# Patient Record
Sex: FEMALE | Race: WHITE | Employment: STUDENT | URBAN - METROPOLITAN AREA
[De-identification: names, ages, dates, MRNs, and addresses within clinical notes are randomized per-mention and may not be internally consistent; named-entity substitution may affect disease eponyms.]

---
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
## 2019-09-25 LAB
ABO, EXTERNAL RESULT: NORMAL
GBS, EXTERNAL RESULT: POSITIVE
HEP B, EXTERNAL RESULT: NEGATIVE
HIV, EXTERNAL RESULT: NON REACTIVE
RH FACTOR, EXTERNAL RESULT: NEGATIVE
RPR, EXTERNAL RESULT: NEGATIVE
RUBELLA TITER, EXTERNAL RESULT: NORMAL

## 2020-02-04 ENCOUNTER — HOSPITAL ENCOUNTER (OUTPATIENT)
Dept: NURSING | Age: 20
Setting detail: INFUSION SERIES
Discharge: HOME | End: 2020-02-04
Payer: COMMERCIAL

## 2020-02-04 VITALS
RESPIRATION RATE: 16 BRPM | BODY MASS INDEX: 26.03 KG/M2 | HEIGHT: 66 IN | HEART RATE: 102 BPM | TEMPERATURE: 97.4 F | DIASTOLIC BLOOD PRESSURE: 78 MMHG | WEIGHT: 162 LBS | SYSTOLIC BLOOD PRESSURE: 123 MMHG

## 2020-02-04 LAB — RHIG LOT NUMBER: NORMAL

## 2020-02-04 PROCEDURE — 96372 THER/PROPH/DIAG INJ SC/IM: CPT

## 2020-02-04 PROCEDURE — 6360000002 HC RX W HCPCS: Performed by: ADVANCED PRACTICE MIDWIFE

## 2020-02-04 RX ADMIN — HUMAN RHO(D) IMMUNE GLOBULIN 300 MCG: 300 INJECTION, SOLUTION INTRAMUSCULAR at 10:56

## 2020-02-04 NOTE — PROGRESS NOTES
Pt tolerates injection well discharge instructions given and reviewed with patient. Instructed to stay in facility 15 minutes post injection and return to unit with any s/s of reaction.

## 2020-04-14 ENCOUNTER — HOSPITAL ENCOUNTER (INPATIENT)
Age: 20
LOS: 3 days | Discharge: HOME | End: 2020-04-17
Attending: OBSTETRICS & GYNECOLOGY | Admitting: OBSTETRICS & GYNECOLOGY
Payer: COMMERCIAL

## 2020-04-14 ENCOUNTER — APPOINTMENT (OUTPATIENT)
Dept: LABOR AND DELIVERY | Age: 20
End: 2020-04-14
Payer: COMMERCIAL

## 2020-04-14 PROBLEM — O13.3 GESTATIONAL HYPERTENSION, THIRD TRIMESTER: Status: ACTIVE | Noted: 2020-04-14

## 2020-04-14 LAB
A/G RATIO: 1.1 (ref 1.1–2.2)
ABO/RH: NORMAL
ALBUMIN SERPL-MCNC: 3.7 G/DL (ref 3.4–5)
ALP BLD-CCNC: 128 U/L (ref 40–129)
ALT SERPL-CCNC: 7 U/L (ref 10–40)
AMPHETAMINE SCREEN, URINE: NORMAL
ANION GAP SERPL CALCULATED.3IONS-SCNC: 13 MMOL/L (ref 3–16)
ANTIBODY IDENTIFICATION: NORMAL
ANTIBODY SCREEN: NORMAL
AST SERPL-CCNC: 16 U/L (ref 15–37)
BARBITURATE SCREEN URINE: NORMAL
BASOPHILS ABSOLUTE: 0 K/UL (ref 0–0.2)
BASOPHILS RELATIVE PERCENT: 0.2 %
BENZODIAZEPINE SCREEN, URINE: NORMAL
BILIRUB SERPL-MCNC: <0.2 MG/DL (ref 0–1)
BUN BLDV-MCNC: 7 MG/DL (ref 7–20)
BUPRENORPHINE URINE: NORMAL
CALCIUM SERPL-MCNC: 9.7 MG/DL (ref 8.3–10.6)
CANNABINOID SCREEN URINE: NORMAL
CHLORIDE BLD-SCNC: 105 MMOL/L (ref 99–110)
CO2: 20 MMOL/L (ref 21–32)
COCAINE METABOLITE SCREEN URINE: NORMAL
CREAT SERPL-MCNC: <0.5 MG/DL (ref 0.6–1.1)
CREATININE URINE: 46.1 MG/DL (ref 28–259)
DAT IGG CAPTURE: NORMAL
EOSINOPHILS ABSOLUTE: 0.1 K/UL (ref 0–0.6)
EOSINOPHILS RELATIVE PERCENT: 1.1 %
GFR AFRICAN AMERICAN: >60
GFR NON-AFRICAN AMERICAN: >60
GLOBULIN: 3.3 G/DL
GLUCOSE BLD-MCNC: 85 MG/DL (ref 70–99)
HCT VFR BLD CALC: 39 % (ref 36–48)
HEMOGLOBIN: 13.3 G/DL (ref 12–16)
LYMPHOCYTES ABSOLUTE: 1.2 K/UL (ref 1–5.1)
LYMPHOCYTES RELATIVE PERCENT: 11.7 %
Lab: NORMAL
MCH RBC QN AUTO: 30.8 PG (ref 26–34)
MCHC RBC AUTO-ENTMCNC: 34 G/DL (ref 31–36)
MCV RBC AUTO: 90.5 FL (ref 80–100)
METHADONE SCREEN, URINE: NORMAL
MONOCYTES ABSOLUTE: 0.5 K/UL (ref 0–1.3)
MONOCYTES RELATIVE PERCENT: 5.2 %
NEUTROPHILS ABSOLUTE: 8.5 K/UL (ref 1.7–7.7)
NEUTROPHILS RELATIVE PERCENT: 81.8 %
OPIATE SCREEN URINE: NORMAL
OXYCODONE URINE: NORMAL
PDW BLD-RTO: 15 % (ref 12.4–15.4)
PH UA: 6
PHENCYCLIDINE SCREEN URINE: NORMAL
PLATELET # BLD: 117 K/UL (ref 135–450)
PMV BLD AUTO: 10.3 FL (ref 5–10.5)
POTASSIUM SERPL-SCNC: 3.8 MMOL/L (ref 3.5–5.1)
PROPOXYPHENE SCREEN: NORMAL
PROTEIN PROTEIN: 10 MG/DL
PROTEIN/CREAT RATIO: 0.2 MG/DL
RBC # BLD: 4.31 M/UL (ref 4–5.2)
SODIUM BLD-SCNC: 138 MMOL/L (ref 136–145)
TOTAL PROTEIN: 7 G/DL (ref 6.4–8.2)
URIC ACID, SERUM: 6 MG/DL (ref 2.6–6)
WBC # BLD: 10.3 K/UL (ref 4–11)

## 2020-04-14 PROCEDURE — 86900 BLOOD TYPING SEROLOGIC ABO: CPT

## 2020-04-14 PROCEDURE — 82570 ASSAY OF URINE CREATININE: CPT

## 2020-04-14 PROCEDURE — 1220000000 HC SEMI PRIVATE OB R&B

## 2020-04-14 PROCEDURE — 80307 DRUG TEST PRSMV CHEM ANLYZR: CPT

## 2020-04-14 PROCEDURE — 2580000003 HC RX 258

## 2020-04-14 PROCEDURE — 84156 ASSAY OF PROTEIN URINE: CPT

## 2020-04-14 PROCEDURE — 85025 COMPLETE CBC W/AUTO DIFF WBC: CPT

## 2020-04-14 PROCEDURE — 86870 RBC ANTIBODY IDENTIFICATION: CPT

## 2020-04-14 PROCEDURE — 86880 COOMBS TEST DIRECT: CPT

## 2020-04-14 PROCEDURE — 86780 TREPONEMA PALLIDUM: CPT

## 2020-04-14 PROCEDURE — 86901 BLOOD TYPING SEROLOGIC RH(D): CPT

## 2020-04-14 PROCEDURE — 86850 RBC ANTIBODY SCREEN: CPT

## 2020-04-14 PROCEDURE — 84550 ASSAY OF BLOOD/URIC ACID: CPT

## 2020-04-14 PROCEDURE — 80053 COMPREHEN METABOLIC PANEL: CPT

## 2020-04-14 PROCEDURE — 6370000000 HC RX 637 (ALT 250 FOR IP): Performed by: OBSTETRICS & GYNECOLOGY

## 2020-04-14 RX ORDER — ONDANSETRON 2 MG/ML
4 INJECTION INTRAMUSCULAR; INTRAVENOUS EVERY 6 HOURS PRN
Status: DISCONTINUED | OUTPATIENT
Start: 2020-04-14 | End: 2020-04-15

## 2020-04-14 RX ORDER — SODIUM CHLORIDE, SODIUM LACTATE, POTASSIUM CHLORIDE, CALCIUM CHLORIDE 600; 310; 30; 20 MG/100ML; MG/100ML; MG/100ML; MG/100ML
INJECTION, SOLUTION INTRAVENOUS CONTINUOUS
Status: DISCONTINUED | OUTPATIENT
Start: 2020-04-14 | End: 2020-04-15

## 2020-04-14 RX ORDER — SODIUM CHLORIDE 0.9 % (FLUSH) 0.9 %
10 SYRINGE (ML) INJECTION EVERY 12 HOURS SCHEDULED
Status: DISCONTINUED | OUTPATIENT
Start: 2020-04-14 | End: 2020-04-15

## 2020-04-14 RX ORDER — SODIUM CHLORIDE 0.9 % (FLUSH) 0.9 %
10 SYRINGE (ML) INJECTION PRN
Status: DISCONTINUED | OUTPATIENT
Start: 2020-04-14 | End: 2020-04-15

## 2020-04-14 RX ORDER — SODIUM CHLORIDE, SODIUM LACTATE, POTASSIUM CHLORIDE, CALCIUM CHLORIDE 600; 310; 30; 20 MG/100ML; MG/100ML; MG/100ML; MG/100ML
INJECTION, SOLUTION INTRAVENOUS
Status: COMPLETED
Start: 2020-04-14 | End: 2020-04-14

## 2020-04-14 RX ADMIN — SODIUM CHLORIDE, SODIUM LACTATE, POTASSIUM CHLORIDE, CALCIUM CHLORIDE: 600; 310; 30; 20 INJECTION, SOLUTION INTRAVENOUS at 19:40

## 2020-04-14 RX ADMIN — SODIUM CHLORIDE, POTASSIUM CHLORIDE, SODIUM LACTATE AND CALCIUM CHLORIDE: 600; 310; 30; 20 INJECTION, SOLUTION INTRAVENOUS at 19:40

## 2020-04-14 RX ADMIN — Medication 25 MCG: at 20:27

## 2020-04-15 ENCOUNTER — ANESTHESIA EVENT (OUTPATIENT)
Dept: LABOR AND DELIVERY | Age: 20
End: 2020-04-15
Payer: COMMERCIAL

## 2020-04-15 ENCOUNTER — ANESTHESIA (OUTPATIENT)
Dept: LABOR AND DELIVERY | Age: 20
End: 2020-04-15
Payer: COMMERCIAL

## 2020-04-15 LAB — TOTAL SYPHILLIS IGG/IGM: NORMAL

## 2020-04-15 PROCEDURE — 2580000003 HC RX 258

## 2020-04-15 PROCEDURE — 2500000003 HC RX 250 WO HCPCS: Performed by: NURSE ANESTHETIST, CERTIFIED REGISTERED

## 2020-04-15 PROCEDURE — 2580000003 HC RX 258: Performed by: OBSTETRICS & GYNECOLOGY

## 2020-04-15 PROCEDURE — 6360000002 HC RX W HCPCS: Performed by: OBSTETRICS & GYNECOLOGY

## 2020-04-15 PROCEDURE — 7200000001 HC VAGINAL DELIVERY

## 2020-04-15 PROCEDURE — 3700000025 EPIDURAL BLOCK: Performed by: ANESTHESIOLOGY

## 2020-04-15 PROCEDURE — 0UQMXZZ REPAIR VULVA, EXTERNAL APPROACH: ICD-10-PCS | Performed by: ADVANCED PRACTICE MIDWIFE

## 2020-04-15 PROCEDURE — 6370000000 HC RX 637 (ALT 250 FOR IP): Performed by: ADVANCED PRACTICE MIDWIFE

## 2020-04-15 PROCEDURE — 3E0P7VZ INTRODUCTION OF HORMONE INTO FEMALE REPRODUCTIVE, VIA NATURAL OR ARTIFICIAL OPENING: ICD-10-PCS | Performed by: ADVANCED PRACTICE MIDWIFE

## 2020-04-15 PROCEDURE — 1220000000 HC SEMI PRIVATE OB R&B

## 2020-04-15 PROCEDURE — 51701 INSERT BLADDER CATHETER: CPT

## 2020-04-15 RX ORDER — DOCUSATE SODIUM 100 MG/1
100 CAPSULE, LIQUID FILLED ORAL 2 TIMES DAILY
Status: DISCONTINUED | OUTPATIENT
Start: 2020-04-15 | End: 2020-04-17 | Stop reason: HOSPADM

## 2020-04-15 RX ORDER — SODIUM CHLORIDE, SODIUM LACTATE, POTASSIUM CHLORIDE, CALCIUM CHLORIDE 600; 310; 30; 20 MG/100ML; MG/100ML; MG/100ML; MG/100ML
INJECTION, SOLUTION INTRAVENOUS CONTINUOUS
Status: DISCONTINUED | OUTPATIENT
Start: 2020-04-15 | End: 2020-04-17 | Stop reason: HOSPADM

## 2020-04-15 RX ORDER — IBUPROFEN 800 MG/1
800 TABLET ORAL EVERY 8 HOURS
Status: DISCONTINUED | OUTPATIENT
Start: 2020-04-16 | End: 2020-04-17 | Stop reason: HOSPADM

## 2020-04-15 RX ORDER — SODIUM CHLORIDE 0.9 % (FLUSH) 0.9 %
10 SYRINGE (ML) INJECTION EVERY 12 HOURS SCHEDULED
Status: DISCONTINUED | OUTPATIENT
Start: 2020-04-15 | End: 2020-04-17 | Stop reason: HOSPADM

## 2020-04-15 RX ORDER — BUPIVACAINE HYDROCHLORIDE 5 MG/ML
INJECTION, SOLUTION EPIDURAL; INTRACAUDAL PRN
Status: DISCONTINUED | OUTPATIENT
Start: 2020-04-15 | End: 2020-04-15 | Stop reason: SDUPTHER

## 2020-04-15 RX ORDER — ACETAMINOPHEN 325 MG/1
650 TABLET ORAL EVERY 4 HOURS PRN
Status: DISCONTINUED | OUTPATIENT
Start: 2020-04-15 | End: 2020-04-17 | Stop reason: HOSPADM

## 2020-04-15 RX ORDER — LIDOCAINE HYDROCHLORIDE 20 MG/ML
INJECTION, SOLUTION EPIDURAL; INFILTRATION; INTRACAUDAL; PERINEURAL PRN
Status: DISCONTINUED | OUTPATIENT
Start: 2020-04-15 | End: 2020-04-15 | Stop reason: SDUPTHER

## 2020-04-15 RX ORDER — SODIUM CHLORIDE, SODIUM LACTATE, POTASSIUM CHLORIDE, CALCIUM CHLORIDE 600; 310; 30; 20 MG/100ML; MG/100ML; MG/100ML; MG/100ML
INJECTION, SOLUTION INTRAVENOUS
Status: COMPLETED
Start: 2020-04-15 | End: 2020-04-15

## 2020-04-15 RX ORDER — BUPIVACAINE HYDROCHLORIDE 2.5 MG/ML
INJECTION, SOLUTION EPIDURAL; INFILTRATION; INTRACAUDAL PRN
Status: DISCONTINUED | OUTPATIENT
Start: 2020-04-15 | End: 2020-04-15 | Stop reason: SDUPTHER

## 2020-04-15 RX ORDER — LANOLIN 100 %
OINTMENT (GRAM) TOPICAL PRN
Status: DISCONTINUED | OUTPATIENT
Start: 2020-04-15 | End: 2020-04-17 | Stop reason: HOSPADM

## 2020-04-15 RX ORDER — SODIUM CHLORIDE 0.9 % (FLUSH) 0.9 %
10 SYRINGE (ML) INJECTION PRN
Status: DISCONTINUED | OUTPATIENT
Start: 2020-04-15 | End: 2020-04-17 | Stop reason: HOSPADM

## 2020-04-15 RX ADMIN — SODIUM CHLORIDE, POTASSIUM CHLORIDE, SODIUM LACTATE AND CALCIUM CHLORIDE: 600; 310; 30; 20 INJECTION, SOLUTION INTRAVENOUS at 01:02

## 2020-04-15 RX ADMIN — LIDOCAINE HYDROCHLORIDE 2 ML: 20 INJECTION, SOLUTION EPIDURAL; INFILTRATION; INTRACAUDAL; PERINEURAL at 12:42

## 2020-04-15 RX ADMIN — Medication 2.5 MILLION UNITS: at 07:21

## 2020-04-15 RX ADMIN — Medication 2.5 MILLION UNITS: at 11:27

## 2020-04-15 RX ADMIN — DEXTROSE MONOHYDRATE 5 MILLION UNITS: 5 INJECTION INTRAVENOUS at 02:44

## 2020-04-15 RX ADMIN — Medication 2.5 MILLION UNITS: at 15:15

## 2020-04-15 RX ADMIN — DOCUSATE SODIUM 100 MG: 100 CAPSULE, LIQUID FILLED ORAL at 19:55

## 2020-04-15 RX ADMIN — BUPIVACAINE HYDROCHLORIDE 3 ML: 5 INJECTION, SOLUTION EPIDURAL; INTRACAUDAL; PERINEURAL at 12:42

## 2020-04-15 RX ADMIN — BUPIVACAINE HYDROCHLORIDE 2.5 ML: 5 INJECTION, SOLUTION EPIDURAL; INTRACAUDAL; PERINEURAL at 02:22

## 2020-04-15 RX ADMIN — Medication 15 ML/HR: at 02:28

## 2020-04-15 RX ADMIN — SODIUM CHLORIDE, POTASSIUM CHLORIDE, SODIUM LACTATE AND CALCIUM CHLORIDE 1000 ML: 600; 310; 30; 20 INJECTION, SOLUTION INTRAVENOUS at 18:18

## 2020-04-15 RX ADMIN — BUPIVACAINE HYDROCHLORIDE 3 ML: 2.5 INJECTION, SOLUTION EPIDURAL; INFILTRATION; INTRACAUDAL; PERINEURAL at 12:42

## 2020-04-15 RX ADMIN — BUPIVACAINE HYDROCHLORIDE 2.5 ML: 2.5 INJECTION, SOLUTION EPIDURAL; INFILTRATION; INTRACAUDAL; PERINEURAL at 02:22

## 2020-04-15 RX ADMIN — BUPIVACAINE HYDROCHLORIDE 5 ML: 5 INJECTION, SOLUTION EPIDURAL; INTRACAUDAL; PERINEURAL at 06:20

## 2020-04-15 RX ADMIN — SODIUM CHLORIDE, POTASSIUM CHLORIDE, SODIUM LACTATE AND CALCIUM CHLORIDE: 600; 310; 30; 20 INJECTION, SOLUTION INTRAVENOUS at 04:11

## 2020-04-15 RX ADMIN — SODIUM CHLORIDE, POTASSIUM CHLORIDE, SODIUM LACTATE AND CALCIUM CHLORIDE: 600; 310; 30; 20 INJECTION, SOLUTION INTRAVENOUS at 11:27

## 2020-04-15 RX ADMIN — BENZOCAINE AND LEVOMENTHOL: 200; 5 SPRAY TOPICAL at 19:45

## 2020-04-15 RX ADMIN — BUPIVACAINE HYDROCHLORIDE 5 ML: 2.5 INJECTION, SOLUTION EPIDURAL; INFILTRATION; INTRACAUDAL; PERINEURAL at 06:20

## 2020-04-15 NOTE — PLAN OF CARE
Problem: Anxiety:  Goal: Level of anxiety will decrease  Description: Level of anxiety will decrease  Outcome: Ongoing     Problem: Breathing Pattern - Ineffective:  Goal: Able to breathe comfortably  Description: Able to breathe comfortably  Outcome: Ongoing     Problem: Fluid Volume - Imbalance:  Goal: Absence of imbalanced fluid volume signs and symptoms  Description: Absence of imbalanced fluid volume signs and symptoms  Outcome: Ongoing  Goal: Absence of intrapartum hemorrhage signs and symptoms  Description: Absence of intrapartum hemorrhage signs and symptoms  Outcome: Ongoing     Problem: Infection - Intrapartum Infection:  Goal: Will show no infection signs and symptoms  Description: Will show no infection signs and symptoms  Outcome: Ongoing     Problem: Labor Process - Prolonged:  Goal: Labor progression, first stage, within specified pattern  Description: Labor progression, first stage, within specified pattern  Outcome: Ongoing  Goal: Labor progession, second stage, within specified pattern  Description: Labor progession, second stage, within specified pattern  Outcome: Ongoing  Goal: Uterine contractions within specified parameters  Description: Uterine contractions within specified parameters  Outcome: Ongoing     Problem:  Screening:  Goal: Ability to make informed decisions regarding treatment has improved  Description: Ability to make informed decisions regarding treatment has improved  Outcome: Ongoing     Problem: Pain - Acute:  Goal: Pain level will decrease  Description: Pain level will decrease  Outcome: Ongoing  Goal: Able to cope with pain  Description: Able to cope with pain  Outcome: Ongoing     Problem: Tissue Perfusion - Uteroplacental, Altered:  Goal: Absence of abnormal fetal heart rate pattern  Description: Absence of abnormal fetal heart rate pattern  Outcome: Ongoing     Problem: Urinary Retention:  Goal: Experiences of bladder distention will decrease  Description:

## 2020-04-15 NOTE — PROCEDURES
Department of Obstetrics and Gynecology  Spontaneous Vaginal Delivery Note    Labor & Delivery Summary  Dilation Complete Date: 04/15/20  Dilation Complete Time: 1415    Pre-operative Diagnosis:  Term pregnancy    Post-operative Diagnosis:  Living  infant(s) and Female    Procedure:  Spontaneous vaginal delivery    Surgeon:       Information for the patient's :  Cartorie Lawson Agnes Resendiz [[id number]]          Anesthesia:  epidural anesthesia    Estimated blood loss:  300ml    Specimen:  Placenta not sent to pathology     Cord blood sent Yes    Complications:  Gestational HTN    Condition:  infant stable to general nursery and mother stable    Details of Procedure: The patient is a 23 y.o. female at 38w9d   OB History        1    Para        Term                AB        Living           SAB        TAB        Ectopic        Molar        Multiple        Live Births                 who was admitted for induction secondary to [facility] @ 40 5/7 wks. She received the following interventions: vaginal Cytotec She was known to be GBS positive and did receive antibiotic prophylaxis. SROM with thin meconium. The patient progressed well,did receive an epidural, became complete and started to push. After pushing for 45 min the fetal head was at the perineum, 20 second shoulder dystocia resolved with laying bed down and Anshu and the rest of the infant delivered atraumatically, placed on mother abdomen. Cord was clamped and cut after delayed cord clamping and infant remained on mother's abdomen for nurse evaluation Apgars 8/9. The delivery of the placenta was spontaneous. The perineum and vagina were explored and a bilateral periurethral repaired with 4.0 vicryl under existing epidural.  in the standard fashion. Dr. Kenyetta Salmeron will be notified of this delivery.

## 2020-04-15 NOTE — ANESTHESIA PROCEDURE NOTES
Epidural Block    Patient location during procedure: OB  Start time: 4/15/2020 1:46 AM  End time: 4/15/2020 2:28 AM  Reason for block: labor epidural  Staffing  Resident/CRNA: STEPHANIA Colvin - CRNA  Performed: resident/CRNA   Preanesthetic Checklist  Completed: patient identified, site marked, surgical consent, pre-op evaluation, timeout performed, IV checked, risks and benefits discussed, monitors and equipment checked, anesthesia consent given, oxygen available and patient being monitored  Epidural  Patient position: sitting  Prep: ChloraPrep  Patient monitoring: continuous pulse ox and frequent blood pressure checks  Approach: midline  Location: lumbar (1-5) (L4-5)  Injection technique: SAY saline  Provider prep: mask and sterile gloves  Needle  Needle type: Tuohy   Needle gauge: 17 G  Needle length: 3.5 in  Catheter type: side hole  Catheter size: 19 G (20 G)  Catheter at skin depth: 10 cm  Test dose: negative  Assessment  Sensory level: T6  Hemodynamics: stable  Attempts: 3+  Additional Notes  Sitting, Sterile prep/drape, 1%Xylo at L3-4, 17ga Tuohy, attempt x 3 w/o success, 1% Xylo at L2-3, attempt x 2 w/o success, 1 % Xylo at L4-5, 17ga Tuohy with SAY on 2nd attempt, Catheter inserted, negative test dose, sterile dressing applied.

## 2020-04-16 LAB
ABO/RH: NORMAL
FETAL SCREEN: NORMAL
HCT VFR BLD CALC: 29.3 % (ref 36–48)
HEMOGLOBIN: 9.8 G/DL (ref 12–16)
MCH RBC QN AUTO: 30.7 PG (ref 26–34)
MCHC RBC AUTO-ENTMCNC: 33.6 G/DL (ref 31–36)
MCV RBC AUTO: 91.3 FL (ref 80–100)
PDW BLD-RTO: 14.9 % (ref 12.4–15.4)
PLATELET # BLD: 97 K/UL (ref 135–450)
PLATELET SLIDE REVIEW: ABNORMAL
PMV BLD AUTO: 10.3 FL (ref 5–10.5)
RBC # BLD: 3.21 M/UL (ref 4–5.2)
RHIG LOT NUMBER: NORMAL
SLIDE REVIEW: ABNORMAL
WBC # BLD: 12.8 K/UL (ref 4–11)

## 2020-04-16 PROCEDURE — 6360000002 HC RX W HCPCS: Performed by: ADVANCED PRACTICE MIDWIFE

## 2020-04-16 PROCEDURE — 6370000000 HC RX 637 (ALT 250 FOR IP): Performed by: ADVANCED PRACTICE MIDWIFE

## 2020-04-16 PROCEDURE — 85027 COMPLETE CBC AUTOMATED: CPT

## 2020-04-16 PROCEDURE — 96372 THER/PROPH/DIAG INJ SC/IM: CPT

## 2020-04-16 PROCEDURE — 86900 BLOOD TYPING SEROLOGIC ABO: CPT

## 2020-04-16 PROCEDURE — 85461 HEMOGLOBIN FETAL: CPT

## 2020-04-16 PROCEDURE — 86901 BLOOD TYPING SEROLOGIC RH(D): CPT

## 2020-04-16 PROCEDURE — 1220000000 HC SEMI PRIVATE OB R&B

## 2020-04-16 PROCEDURE — 36415 COLL VENOUS BLD VENIPUNCTURE: CPT

## 2020-04-16 RX ORDER — FERROUS SULFATE 325(65) MG
325 TABLET ORAL 2 TIMES DAILY WITH MEALS
Status: DISCONTINUED | OUTPATIENT
Start: 2020-04-16 | End: 2020-04-17 | Stop reason: HOSPADM

## 2020-04-16 RX ADMIN — MOXIFLOXACIN HYDROCHLORIDE 800 MG: 400 TABLET, FILM COATED ORAL at 18:08

## 2020-04-16 RX ADMIN — DOCUSATE SODIUM 100 MG: 100 CAPSULE, LIQUID FILLED ORAL at 20:49

## 2020-04-16 RX ADMIN — MOXIFLOXACIN HYDROCHLORIDE 800 MG: 400 TABLET, FILM COATED ORAL at 08:08

## 2020-04-16 RX ADMIN — FERROUS SULFATE TAB 325 MG (65 MG ELEMENTAL FE) 325 MG: 325 (65 FE) TAB at 20:49

## 2020-04-16 RX ADMIN — HUMAN RHO(D) IMMUNE GLOBULIN 300 MCG: 300 INJECTION, SOLUTION INTRAMUSCULAR at 15:11

## 2020-04-16 RX ADMIN — DOCUSATE SODIUM 100 MG: 100 CAPSULE, LIQUID FILLED ORAL at 08:08

## 2020-04-16 ASSESSMENT — PAIN SCALES - GENERAL
PAINLEVEL_OUTOF10: 4
PAINLEVEL_OUTOF10: 6

## 2020-04-16 NOTE — L&D DELIVERY SUMMARY NOTE
[address] [address]                            LABOR AND DELIVERY NOTE    PATIENT NAME: Minal Henson                       :        2000  MED REC NO:   [id number]                          ROOM:       4088  ACCOUNT NO:   [de-identified]                           ADMIT DATE: 2020  PROVIDER:     Glenn Andersen CNM    DATE OF PROCEDURE:  04/15/2020    DELIVERY SUMMARY    The patient is a 18-year-old  1, para 0 with an RADHA of 04/10/2020  at 36 and 5 weeks gestation who was admitted on 2020 with  gestational hypertension for an induction of labor. Pregnancy course  was complicated by gestational hypertension. Group B strep status  positive. On admission, she was 1, 50, -2 station with a vertex  presentation. Fetal heart tones were reassuring. Contractions were  irregular. The patient desired an epidural.  She had Cytotec 25 mcg  vaginally x1 and progressed well. Her membranes ruptured spontaneously  for meconium-stained fluid. She became complete and pushed for 45  minutes and at [phone] on 04/15/2020, she had a spontaneous vaginal  delivery of a liveborn female. After the delivery of the head, the  shoulders did not come with gentle downward traction, so the head of the  bed was lowered and her legs were pulled back in Anshu and anterior  shoulder delivered easily after about 20 seconds total after the head  delivered. Apgar scores were 8 and 9. The placenta delivered  spontaneously and was inspected and found to be intact. The uterus  contracted with massage. Pitocin was added to the IV. EBL was 300 mL. Perineum and vagina were inspected and found to have bilateral  periurethral lacerations. This was repaired with 4-0 suture under  existing epidural in the usual fashion. Mother and baby entered the  postpartum period in excellent condition.   Dr. Brandi Monteiro will be notified  of this

## 2020-04-16 NOTE — ANESTHESIA POSTPROCEDURE EVALUATION
Department of Anesthesiology  Postprocedure Note    Patient: Fatoumata Dupont  MRN: [id number]  YOB: 2000  Date of evaluation: 4/16/2020  Time:  7:01 AM     Procedure Summary     Date:  04/15/20 Room / Location:      Anesthesia Start:  0146 Anesthesia Stop:  1633    Procedure:  Labor Analgesia Diagnosis:      Scheduled Providers:   Responsible Provider:  Sin Payan MD    Anesthesia Type:  epidural ASA Status:  2 - Emergent          Anesthesia Type: No value filed. Kristy Phase I: Kristy Score: 9    Kristy Phase II: Kristy Score: 10    Last vitals: Reviewed and per EMR flowsheets. Anesthesia Post Evaluation    Level of consciousness: awake and alert  Nausea & Vomiting: no vomiting and no nausea  Complications: no  Cardiovascular status: hemodynamically stable  Respiratory status: acceptable and room air  Hydration status: stable  Comments: Patient is s/p vaginal delivery with epidural analgesia. Progress notes, flow sheets, labs, and MARs reviewed. No apparent or reported anesthesia complications thus far.

## 2020-04-17 VITALS
RESPIRATION RATE: 16 BRPM | HEART RATE: 85 BPM | TEMPERATURE: 98.4 F | DIASTOLIC BLOOD PRESSURE: 86 MMHG | BODY MASS INDEX: 28.93 KG/M2 | HEIGHT: 66 IN | SYSTOLIC BLOOD PRESSURE: 127 MMHG | WEIGHT: 180 LBS

## 2020-04-17 PROCEDURE — 6370000000 HC RX 637 (ALT 250 FOR IP): Performed by: ADVANCED PRACTICE MIDWIFE

## 2020-04-17 RX ORDER — IBUPROFEN 800 MG/1
800 TABLET ORAL EVERY 8 HOURS
Qty: 120 TABLET | Refills: 3 | Status: SHIPPED | OUTPATIENT
Start: 2020-04-17

## 2020-04-17 RX ORDER — FERROUS SULFATE 325(65) MG
325 TABLET ORAL 2 TIMES DAILY WITH MEALS
Qty: 30 TABLET | Refills: 3 | Status: SHIPPED | OUTPATIENT
Start: 2020-04-17 | End: 2021-02-02 | Stop reason: ALTCHOICE

## 2020-04-17 RX ADMIN — FERROUS SULFATE TAB 325 MG (65 MG ELEMENTAL FE) 325 MG: 325 (65 FE) TAB at 08:53

## 2020-04-17 RX ADMIN — DOCUSATE SODIUM 100 MG: 100 CAPSULE, LIQUID FILLED ORAL at 08:53

## 2020-04-17 RX ADMIN — MOXIFLOXACIN HYDROCHLORIDE 800 MG: 400 TABLET, FILM COATED ORAL at 02:31

## 2020-04-17 RX ADMIN — MOXIFLOXACIN HYDROCHLORIDE 800 MG: 400 TABLET, FILM COATED ORAL at 10:41

## 2020-04-17 ASSESSMENT — PAIN SCALES - GENERAL
PAINLEVEL_OUTOF10: 0
PAINLEVEL_OUTOF10: 3

## 2020-04-17 NOTE — PROGRESS NOTES
Department of Obstetrics and Gynecology  Labor and Delivery  Attending Post Partum Progress Note      SUBJECTIVE:  Feels well, denies headaches, visual changes and epigastric pain. Voiding qs, tolerating regular diet, ambulating well. Discomfort well controlled w/ meds. Baby is bottle feeding well. OBJECTIVE:      Vitals:  /72   Pulse 96   Temp 98.1 °F (36.7 °C) (Oral)   Resp 16   Ht 5' 6\" (1.676 m)   Wt 180 lb (81.6 kg)   LMP 07/04/2019   Breastfeeding Unknown   BMI 29.05 kg/m²   B/P stable over last 12 hours 124-159/72-99  ABDOMEN:  Soft/NT [email or web address]  GENITAL/URINARY:  SLR  LE: No evidence of DVT, DTR 2+, no clonus    DATA:    CBC:    Lab Results   Component Value Date    WBC 12.8 04/16/2020    RBC 3.21 04/16/2020    HGB 9.8 04/16/2020    HCT 29.3 04/16/2020    MCV 91.3 04/16/2020    RDW 14.9 04/16/2020    PLT 97 04/16/2020       ASSESSMENT & PLAN:      Primip s/p vaginal delivery  GHTN with mild range b/p  Gestational thrombocytopenia prior to [facility] diagnosis.  Plts 117 on admit/ 97 post partum day #1  Anemia-secondary to acute blood loss at delivery  Stable bottle feeding male infant  Begin iron  Rev breast care for bottle feeding  Continue plan of care  Anticipate discharge tomorrow
Infant girl delivered at this time. 30 second shoulder dystocia. 1639: Placenta delivered at this time.
Jumana Watkins CNM updated at this time that FHR baseline is still 165-170bpm since 2315 and that fluids have been restarted. RN instructed to continue plan of care as long as variability is reassuring and there are not recurrent decelerations.  RN to notify provider if baseline exceeds 170bpm.
Larry SWIFT at bedside for redose.
Matt Resendiz [facility] notified at [phone] that FHR baseline has change to 165bpm and has persisted for about 10min. Pt is afebrile. No new orders at this time.
Pt actively pushing. RN and CNM remain in continuous attendance at the bedside assessing fetal heart rate per EFM.
Pt assisted by 2 staff members to restroom for first time get up. Pt denies dizziness or lightheadedness. Gait steady. Pt unable to void at this time. Pericare done by pt with RN instruction. New ice pack, pad and panties put on pt. Gown changed. Hand hygiene done. Complete linen change done and comfort pad put on bed. Pt tolerated well. Fundus checked once pt ambulated back to bed and is U+2 and midline. Will allow pt additional hour to attempt to void and then will straight cath if needed.
SVE performed by [facility]. Pt is 6/80%/-1 with a bulging bag of water. Small amount of bloody show noted. ISC performed. Order received to increase IV fluid rate to 250mL/hr for the next 4 hours.
Shama Singletary CRNA at bedside from [id number] for epidural placement. Some loss of fix on FHR tracing during this time. Pt now rating ctx as a 4/10 intensity.
Strip reviewed with charge KOKO Duncan RN. Given that pt reports sensation of frequent fetal moment and movement is heard on ultrasound, KOKO Duncan believes fetus is very active. Will continue to monitor closely. Holding 0030 dose of cytotec until baseline returns to normal limits.
__________________________  13. Do you have any other questions or concerns I can address today?  Y/N  __________________________________________________      Teaching During interview :_____________________________________________  ___________________________RN       Date:______________Time:________________

## 2021-02-02 ENCOUNTER — HOSPITAL ENCOUNTER (OUTPATIENT)
Age: 21
Discharge: HOME | End: 2021-02-02
Payer: COMMERCIAL

## 2021-02-02 ENCOUNTER — TELEPHONE (OUTPATIENT)
Dept: INTERNAL MEDICINE CLINIC | Age: 21
End: 2021-02-02

## 2021-02-02 ENCOUNTER — HOSPITAL ENCOUNTER (OUTPATIENT)
Dept: GENERAL RADIOLOGY | Age: 21
Discharge: HOME | End: 2021-02-02
Payer: COMMERCIAL

## 2021-02-02 ENCOUNTER — OFFICE VISIT (OUTPATIENT)
Dept: INTERNAL MEDICINE CLINIC | Age: 21
End: 2021-02-02
Payer: COMMERCIAL

## 2021-02-02 VITALS
BODY MASS INDEX: 22.53 KG/M2 | DIASTOLIC BLOOD PRESSURE: 86 MMHG | HEIGHT: 66 IN | TEMPERATURE: 96.8 F | SYSTOLIC BLOOD PRESSURE: 126 MMHG | WEIGHT: 140.2 LBS | RESPIRATION RATE: 16 BRPM | OXYGEN SATURATION: 99 % | HEART RATE: 85 BPM

## 2021-02-02 DIAGNOSIS — Z11.59 ENCOUNTER FOR HCV SCREENING TEST FOR LOW RISK PATIENT: ICD-10-CM

## 2021-02-02 DIAGNOSIS — G89.29 CHRONIC BILATERAL LOW BACK PAIN WITHOUT SCIATICA: Primary | ICD-10-CM

## 2021-02-02 DIAGNOSIS — M54.50 CHRONIC BILATERAL LOW BACK PAIN WITHOUT SCIATICA: ICD-10-CM

## 2021-02-02 DIAGNOSIS — M54.50 CHRONIC BILATERAL LOW BACK PAIN WITHOUT SCIATICA: Primary | ICD-10-CM

## 2021-02-02 DIAGNOSIS — O13.3 GESTATIONAL HYPERTENSION, THIRD TRIMESTER: ICD-10-CM

## 2021-02-02 DIAGNOSIS — G89.29 CHRONIC BILATERAL LOW BACK PAIN WITHOUT SCIATICA: ICD-10-CM

## 2021-02-02 DIAGNOSIS — Z86.2 HX OF THROMBOCYTOPENIA: ICD-10-CM

## 2021-02-02 PROCEDURE — 72190 X-RAY EXAM OF PELVIS: CPT

## 2021-02-02 PROCEDURE — 99204 OFFICE O/P NEW MOD 45 MIN: CPT | Performed by: INTERNAL MEDICINE

## 2021-02-02 RX ORDER — MELOXICAM 7.5 MG/1
7.5 TABLET ORAL DAILY
Qty: 5 TABLET | Refills: 0 | Status: SHIPPED | OUTPATIENT
Start: 2021-02-02 | End: 2022-05-10

## 2021-02-02 RX ORDER — NORGESTIMATE AND ETHINYL ESTRADIOL 0.25-0.035
1 KIT ORAL
COMMUNITY
Start: 2020-05-29 | End: 2021-04-30

## 2021-02-02 RX ORDER — CYCLOBENZAPRINE HCL 5 MG
5 TABLET ORAL 2 TIMES DAILY PRN
Qty: 10 TABLET | Refills: 0 | Status: SHIPPED | OUTPATIENT
Start: 2021-02-02 | End: 2021-02-07

## 2021-02-02 ASSESSMENT — PATIENT HEALTH QUESTIONNAIRE - PHQ9
2. FEELING DOWN, DEPRESSED OR HOPELESS: 0
SUM OF ALL RESPONSES TO PHQ QUESTIONS 1-9: 0
SUM OF ALL RESPONSES TO PHQ9 QUESTIONS 1 & 2: 0

## 2021-02-02 ASSESSMENT — ENCOUNTER SYMPTOMS
EYE REDNESS: 0
BACK PAIN: 1
DIARRHEA: 0
SHORTNESS OF BREATH: 0

## 2021-02-02 NOTE — ASSESSMENT & PLAN NOTE
Here with chronic back pain, features are concerning for possible inflammatory nature given significant morning stiffness and age though no other associated systemic symptoms.  -We will get ESR and CRP, pelvic x-ray to rule out AS.  - No red flags/ alarm signs to warrant urgent imaging at this time. I have low suspicion of infection (no local tenderness, fever, hx of IVUD), malignancy (no systemic symp, wt changes, risk factors, hx of malignancy), qauda equina syn (no new incontinence or saddle anaesthesia ) or severe neurologic deficits on exam to suggest nerve compression.    -Continue superficial heat   -referral to PT. We discussed the option of trying other nonpharmacologic measures such as  massage and acupuncture. - short course of NASIDs for 5 days which should be taken with food.   - cyclobenzaprine 5 days, discussed potential sedating effect, not while driving.  -Follow-up in 2 months if labs and imaging are reassuring to see if improvement with PT

## 2021-02-02 NOTE — ASSESSMENT & PLAN NOTE
-Documented hypertension during pregnancy, no eclampsia/preeclampsia. Did not require treatment.   -BP WNL today, will continue to monitor clinically

## 2021-02-02 NOTE — PROGRESS NOTES
Pt present to establish and to discuss back pain. Pt states she is still seeing Provider Mason Gamble. Pt informed that if she is to establish Dr. Melissa Odom would become her primary and pt stated she was fine with that. Pt states back issues began shortly after having her daughter in April of 2020, back pain began by end of July 2020 and has been consistent since. Pt informed of possible referral for pain management specialist. Pt stated she has seen Chiropractor and was informed she would not be able to continue care with Chiropractor due to pregnancy. Pt states last PAP was during pregnancy. No records in care everywhere. Will have pt sign HIPPA release form.

## 2021-02-02 NOTE — PROGRESS NOTES
Take 1 tablet by mouth every 8 hours 120 tablet 3     No current facility-administered medications on file prior to visit. No Known Allergies    Past Medical History:   Diagnosis Date    Gestational hypertension, third trimester 4/14/2020       History reviewed. No pertinent surgical history.     Social History     Socioeconomic History    Marital status: Single     Spouse name: Not on file    Number of children: Not on file    Years of education: Not on file    Highest education level: Not on file   Occupational History    Not on file   Social Needs    Financial resource strain: Not on file    Food insecurity     Worry: Not on file     Inability: Not on file    Transportation needs     Medical: Not on file     Non-medical: Not on file   Tobacco Use    Smoking status: Never Smoker    Smokeless tobacco: Never Used   Substance and Sexual Activity    Alcohol use: Not Currently     Comment: monthly    Drug use: No    Sexual activity: Yes     Partners: Male   Lifestyle    Physical activity     Days per week: Not on file     Minutes per session: Not on file    Stress: Not on file   Relationships    Social connections     Talks on phone: Not on file     Gets together: Not on file     Attends Sabianism service: Not on file     Active member of club or organization: Not on file     Attends meetings of clubs or organizations: Not on file     Relationship status: Not on file    Intimate partner violence     Fear of current or ex partner: Not on file     Emotionally abused: Not on file     Physically abused: Not on file     Forced sexual activity: Not on file   Other Topics Concern    Not on file   Social History Narrative    Not on file        Family History   Problem Relation Age of Onset    Diabetes Paternal Grandfather        PE  Vitals:    02/02/21 1105   BP: 126/86   Pulse: 85   Resp: 16   Temp: 96.8 °F (36 °C)   TempSrc: Temporal   SpO2: 99%   Weight: 140 lb 3.2 oz (63.6 kg)   Height: 5' 6\" (1.676 m)     Estimated body mass index is 22.63 kg/m² as calculated from the following:    Height as of this encounter: 5' 6\" (1.676 m). Weight as of this encounter: 140 lb 3.2 oz (63.6 kg). Physical Exam  Vitals signs reviewed. Constitutional:       General: She is not in acute distress. Appearance: Normal appearance. She is well-developed. She is not ill-appearing, toxic-appearing or diaphoretic. HENT:      Head: Normocephalic and atraumatic. Eyes:      Pupils: Pupils are equal, round, and reactive to light. Neck:      Musculoskeletal: Normal range of motion and neck supple. Musculoskeletal: Normal range of motion. General: No swelling, tenderness, deformity or signs of injury. Right lower leg: No edema. Left lower leg: No edema. Comments: No tenderness to palpation over the spine  Negative SLR  No saddle anesthesia   Skin:     General: Skin is warm and dry. Neurological:      General: No focal deficit present. Mental Status: She is alert and oriented to person, place, and time. Sensory: No sensory deficit. Motor: No weakness or abnormal muscle tone.       Coordination: Coordination normal.      Gait: Gait normal.      Deep Tendon Reflexes: Reflexes normal.         Immunization History   Administered Date(s) Administered    DTaP 2000, 2000, 2000, 11/15/2001, 07/14/2004    Hepatitis B 2000, 2000, 2000    Hib, unspecified 1999, 1999, 1999, 11/15/2001    MMR 07/27/2000, 07/14/2004    Meningococcal MCV4P (Menactra) 08/10/2016    Polio IPV (IPOL) 1999, 1999, 11/15/2001, 07/14/2004    Tdap (Boostrix, Adacel) 08/01/2011, 03/12/2020    Varicella (Varivax) 07/27/2000       Health Maintenance   Topic Date Due    Hepatitis C screen  2000    Varicella vaccine (2 of 2 - 2-dose childhood series) 03/11/2003    HPV vaccine (1 - 2-dose series) 03/11/2010    HIV screen  03/11/2014    Chlamydia screen  03/11/2015    Cervical cancer screen  03/11/2020    Flu vaccine (1) 09/01/2020    DTaP/Tdap/Td vaccine (8 - Td) 03/12/2030    Hepatitis B vaccine  Completed    Hib vaccine  Completed    Meningococcal (ACWY) vaccine  Completed    Hepatitis A vaccine  Aged Out    Pneumococcal 0-64 years Vaccine  Aged Out       PSH, PMH, SH and FH reviewed and noted. Recent and past labs, tests and consults also reviewed. Recent or new meds also reviewed. ASSESSMENT/ PLAN:    Chronic bilateral low back pain without sciatica  Here with chronic back pain, features are concerning for possible inflammatory nature given significant morning stiffness and age though no other associated systemic symptoms.  -We will get ESR and CRP, pelvic x-ray to rule out AS.  - No red flags/ alarm signs to warrant urgent imaging at this time. I have low suspicion of infection (no local tenderness, fever, hx of IVUD), malignancy (no systemic symp, wt changes, risk factors, hx of malignancy), qauda equina syn (no new incontinence or saddle anaesthesia ) or severe neurologic deficits on exam to suggest nerve compression.    -Continue superficial heat   -referral to PT. We discussed the option of trying other nonpharmacologic measures such as  massage and acupuncture. - short course of NASIDs for 5 days which should be taken with food. - cyclobenzaprine 5 days, discussed potential sedating effect, not while driving.  -Follow-up in 2 months if labs and imaging are reassuring to see if improvement with PT    Hx of thrombocytopenia  Noted thrombocytopenia during pregnancy  -Repeat CBC today    Gestational hypertension, third trimester  -Documented hypertension during pregnancy, no eclampsia/preeclampsia. Did not require treatment.   -BP WNL today, will continue to monitor clinically      Orders Placed This Encounter   Procedures    XR PELVIS (MIN 3 VIEWS)    HEPATITIS C ANTIBODY    CBC WITH AUTO DIFFERENTIAL    SEDIMENTATION RATE    C-REACTIVE PROTEIN    [facility]     Orders Placed This Encounter   Medications    meloxicam (MOBIC) 7.5 MG tablet     Sig: Take 1 tablet by mouth daily for 5 days     Dispense:  5 tablet     Refill:  0    cyclobenzaprine (FLEXERIL) 5 MG tablet     Sig: Take 1 tablet by mouth 2 times daily as needed for Muscle spasms be aware of sedative effects, avoid driving while taking medication. Dispense:  10 tablet     Refill:  0      Medications Discontinued During This Encounter   Medication Reason    Prenatal MV-Min-Fe Fum-FA-DHA (PRENATAL 1 PO) Therapy completed    ferrous sulfate (IRON 325) 325 (65 Fe) MG tablet Therapy completed        No follow-ups on file. Erika Sevilla MD    This dictation was generated by voice recognition computer software. Although all attempts are made to edit the dictation for accuracy, there may be errors in the transcription that are not intended.

## 2021-12-02 ENCOUNTER — PATIENT MESSAGE (OUTPATIENT)
Dept: FAMILY MEDICINE CLINIC | Age: 21
End: 2021-12-02

## 2021-12-06 ENCOUNTER — TELEPHONE (OUTPATIENT)
Dept: FAMILY MEDICINE CLINIC | Age: 21
End: 2021-12-06

## 2021-12-09 NOTE — TELEPHONE ENCOUNTER
From: Alanis Mayen  Sent: 12/8/2021 8:33 PM EST  To: Jonny Matias  Practice Support  Subject: Appointment Reschedule    I have been trying to get ahold of someone to move my appointment to a sooner date due to some allergy I am experiencing. I am having a very hard time and I moved my appointment a week ago due to a doctor no longer being available. I was suppose to come in 12/30 and got moved to 1/14 or 1/15. I cannot wait another month to see somebody. I am having a very hard time and my entire body itches. I need to see someone asap!

## 2022-05-09 ENCOUNTER — NURSE TRIAGE (OUTPATIENT)
Dept: OTHER | Facility: CLINIC | Age: 22
End: 2022-05-09

## 2022-05-09 ENCOUNTER — TELEPHONE (OUTPATIENT)
Dept: FAMILY MEDICINE CLINIC | Age: 22
End: 2022-05-09

## 2022-05-09 NOTE — TELEPHONE ENCOUNTER
Received call from Pebbles at [facility] with Red Flag Complaint. Subjective: Caller states \"wosening anxiety and increased and increased panic attacks. \"     Current Symptoms: increased anxiety, increased panic attacks, 3-4 per month    Onset: 1 month ago; sudden, rapid, worsening    Associated Symptoms: reduced activity, irritability , fussiness, increased sleepiness, increased wakefulness    Pain Severity: 0/10; N/A; none    Temperature: no n/a    What has been tried: increased exercise, force fluids, meditation, and outdoor activities    LMP: NA Pregnant: NA    Recommended disposition: See PCP within 3 Days    Care advice provided, patient verbalizes understanding; denies any other questions or concerns; instructed to call back for any new or worsening symptoms. Patient/Caller agrees with recommended disposition; writer provided warm transfer to Richie Quintana at [facility] for appointment scheduling     Attention Provider: Thank you for allowing me to participate in the care of your patient. The patient was connected to triage in response to information provided to the ECC/PSC. Please do not respond through this encounter as the response is not directed to a shared pool.             Reason for Disposition   Symptoms interfere with work or school    Protocols used: ANXIETY AND PANIC ATTACK-ADULT-[place]

## 2022-05-09 NOTE — TELEPHONE ENCOUNTER
----- Message from Faheem Daniels sent at 5/9/2022  7:40 AM EDT -----  Subject: Appointment Request    Reason for Call: Urgent Mental Health    QUESTIONS  Type of Appointment? New Patient/New to Provider  Reason for appointment request? No appointments available during search  Additional Information for Provider? Patient was transferred via triage to   be seen in 3 days and unable to schedule due to old PCP listed and no   availability. Patient would like to est care with [facility] or [address] [place] [address],[address]. Will   see anyone but prefers to not see Dalton Menon. Pt needs seen for new onset   anxiety/ panic attacks x's 1 month, Please call to discuss scheduling   ---------------------------------------------------------------------------  --------------  CALL BACK INFO  What is the best way for the office to contact you? OK to leave message on   voicemail  Preferred Call Back Phone Number? [phone]  ---------------------------------------------------------------------------  --------------  SCRIPT ANSWERS  Relationship to Patient? Self  Specialty Confirmation? Primary Care  Are you having thoughts of hurting yourself or others? No  Is this a follow up of prior diagnosis? No  Have you been diagnosed with, awaiting test results for, or told that you   are suspected of having COVID-19 (Coronavirus)? (If patient has tested   negative or was tested as a requirement for work, school, or travel and   not based on symptoms, answer no)? No  Within the past 10 days have you developed any of the following symptoms   (answer no if symptoms have been present longer than 10 days or began   more than 10 days ago)? Fever or Chills, Cough, Shortness of breath or   difficulty breathing, Loss of taste or smell, Sore throat, Nasal   congestion, Sneezing or runny nose, Fatigue or generalized body aches   (answer no if pain is specific to a body part e.g. back pain), Diarrhea,   Headache?  No  Have you had close contact with someone with COVID-19 in the last 7 days? No  (Service Expert  click yes below to proceed with [employer] Tables As Usual   Scheduling)?  Yes

## 2022-05-10 ENCOUNTER — OFFICE VISIT (OUTPATIENT)
Dept: PRIMARY CARE CLINIC | Age: 22
End: 2022-05-10
Payer: COMMERCIAL

## 2022-05-10 VITALS
HEIGHT: 65 IN | TEMPERATURE: 99.1 F | BODY MASS INDEX: 24.06 KG/M2 | HEART RATE: 81 BPM | OXYGEN SATURATION: 97 % | WEIGHT: 144.4 LBS | DIASTOLIC BLOOD PRESSURE: 65 MMHG | SYSTOLIC BLOOD PRESSURE: 111 MMHG

## 2022-05-10 DIAGNOSIS — R53.83 FATIGUE, UNSPECIFIED TYPE: ICD-10-CM

## 2022-05-10 DIAGNOSIS — Z00.00 ANNUAL PHYSICAL EXAM: Primary | ICD-10-CM

## 2022-05-10 DIAGNOSIS — F41.9 ANXIETY: ICD-10-CM

## 2022-05-10 PROCEDURE — 99385 PREV VISIT NEW AGE 18-39: CPT | Performed by: NURSE PRACTITIONER

## 2022-05-10 RX ORDER — LEVONORGESTREL 52 MG/1
1 INTRAUTERINE DEVICE INTRAUTERINE ONCE
COMMUNITY

## 2022-05-10 RX ORDER — SERTRALINE HYDROCHLORIDE 25 MG/1
25 TABLET, FILM COATED ORAL DAILY
Qty: 30 TABLET | Refills: 3 | Status: SHIPPED | OUTPATIENT
Start: 2022-05-10 | End: 2022-08-12 | Stop reason: SDUPTHER

## 2022-05-10 SDOH — ECONOMIC STABILITY: FOOD INSECURITY: WITHIN THE PAST 12 MONTHS, YOU WORRIED THAT YOUR FOOD WOULD RUN OUT BEFORE YOU GOT MONEY TO BUY MORE.: NEVER TRUE

## 2022-05-10 SDOH — ECONOMIC STABILITY: FOOD INSECURITY: WITHIN THE PAST 12 MONTHS, THE FOOD YOU BOUGHT JUST DIDN'T LAST AND YOU DIDN'T HAVE MONEY TO GET MORE.: NEVER TRUE

## 2022-05-10 ASSESSMENT — SOCIAL DETERMINANTS OF HEALTH (SDOH): HOW HARD IS IT FOR YOU TO PAY FOR THE VERY BASICS LIKE FOOD, HOUSING, MEDICAL CARE, AND HEATING?: NOT HARD AT ALL

## 2022-05-10 ASSESSMENT — PATIENT HEALTH QUESTIONNAIRE - PHQ9
SUM OF ALL RESPONSES TO PHQ QUESTIONS 1-9: 0
1. LITTLE INTEREST OR PLEASURE IN DOING THINGS: 0
2. FEELING DOWN, DEPRESSED OR HOPELESS: 0
SUM OF ALL RESPONSES TO PHQ9 QUESTIONS 1 & 2: 0
SUM OF ALL RESPONSES TO PHQ QUESTIONS 1-9: 0

## 2022-05-10 NOTE — PATIENT INSTRUCTIONS
[facility] Locations - No appointment necessary. @ indicates the location is open Saturdays in addition to Monday through Friday. Call your preferred location for test preparation, business hours and other information you need. [employer] accepts [employer]. [facility]    @ [place] [facility] 3 [facility] [address] [place] [address] Road. 9[address] [facility], [address]   Ph: [phone] [facility] Svcs. [address]   Ph: [phone]  @ [facility] Svcs. 3[address] [facility]   Ph: [phone]    [facility] Svcs. [address]   Ph: [phone] @ [place] [address]  Ph: [phone] @ [facility] Svcs. 835 [facility]. 989 [facility], [place] [address]   Ph: [phone]    [place]   @ [place] [facility]. [address] [place], [place] [address]   Ph: [phone] [facility] [address]  Ph: [address] [place] [address] [address] Lab Svcs[address] 54 [facility]   Ph: [phone] [facility]. Lab Svcs.   211 [facility], [facility]   Ph: [phone]

## 2022-05-10 NOTE — PROGRESS NOTES
[address] [facility] [address] PRIMARY CARE  [address]   [address] [facility] [address]  Dept: [phone]  Dept Fax: [phone]     5/10/2022      Lauren Face   2000     Chief Complaint   Patient presents with    Anxiety     fatigue          NEW PATIENT       HPI     Patient presents to get established as a new patient and to get physical. Reports she has been having anxiety over past 2 years after she had her daughter. Feels anxious at least a few times a week; thoughts racing, irritable at times. Over the past month has been having episodes of heart racing, shaking, lightheaded, woozy. Has happened approx 4-5 times in the past month. The first time she thought she maybe had too much caffeine. Has since stopped caffeine and it has continued to occur. The most recent occurrence was this past Saturday- she was out at dinner, sitting, and started to feel like heart race, then vision started to change; felt like she was going to pass out. She got up, walked around, and it improved. Also reports since November has felt exhausted. Reports eating healthy, drinks a lot of water. Exercises 4-5x a week. Runs frequently. She states running is a stress reliever for her but it mostly seems to help in the moment and later feelings of anxiety return. PHQ Scores 5/10/2022 2/2/2021   PHQ2 Score 0 0   PHQ9 Score 0 0     Interpretation of Total Score Depression Severity: 1-4 = Minimal depression, 5-9 = Mild depression, 10-14 = Moderate depression, 15-19 = Moderately severe depression, 20-27 = Severe depression     Prior to Visit Medications    Medication Sig Taking?  Authorizing Provider   levonorgestrel (MIRENA, 52 MG,) IUD 52 mg 1 each by IntraUTERine route once Yes Historical Provider, MD   ibuprofen (ADVIL;MOTRIN) 800 MG tablet Take 1 tablet by mouth every 8 hours Yes STEPHANIA Meehan CNM       Past Medical History:   Diagnosis Date    Gestational hypertension, third trimester 4/14/2020        Social History     Tobacco Use    Smoking status: Never Smoker    Smokeless tobacco: Never Used   Vaping Use    Vaping Use: Never used   Substance Use Topics    Alcohol use: Not Currently     Comment: monthly    Drug use: No        No past surgical history on file. No Known Allergies     Family History   Problem Relation Age of Onset    No Known Problems Mother     No Known Problems Father     No Known Problems Maternal Grandmother     No Known Problems Paternal Grandmother     Diabetes Paternal Grandfather     No Known Problems Sister     No Known Problems Brother     No Known Problems Brother         Patient's past medical history, surgical history, family history, medications, and allergies  were all reviewed and updated as appropriate today. Review of Systems   Constitutional: Positive for fatigue. Negative for fever. HENT: Negative for congestion and sore throat. Eyes: Positive for visual disturbance. Respiratory: Positive for chest tightness. Negative for cough and wheezing. Cardiovascular: Positive for palpitations. Negative for chest pain. Gastrointestinal: Negative for abdominal pain. Genitourinary: Negative for difficulty urinating. Musculoskeletal: Negative for arthralgias and myalgias. Skin: Negative. Neurological: Positive for light-headedness. Negative for dizziness and weakness. Hematological: Negative. Psychiatric/Behavioral: The patient is nervous/anxious. /65 (Cuff Size: Medium Adult)   Pulse 81   Temp 99.1 °F (37.3 °C) (Temporal)   Ht 5' 4.5\" (1.638 m)   Wt 144 lb 6.4 oz (65.5 kg)   SpO2 97% Comment: ROOM AIR  Breastfeeding No   BMI 24.40 kg/m²      Physical Exam  Constitutional:       Appearance: Normal appearance. HENT:      Head: Normocephalic and atraumatic. Eyes:      Extraocular Movements: Extraocular movements intact. Cardiovascular:      Rate and Rhythm: Normal rate and regular rhythm.       Heart sounds: Normal heart sounds. No murmur heard. Pulmonary:      Effort: Pulmonary effort is normal.   Abdominal:      Palpations: Abdomen is soft. Musculoskeletal:         General: Normal range of motion. Cervical back: Normal range of motion. Skin:     General: Skin is warm and dry. Neurological:      General: No focal deficit present. Mental Status: She is alert and oriented to person, place, and time. Psychiatric:         Mood and Affect: Mood normal.         Behavior: Behavior normal.         Assessment:  Encounter Diagnoses   Name Primary?  Annual physical exam Yes    Fatigue, unspecified type     Anxiety        Plan:  1. Annual physical exam  General wellness exam. Reviewed chart for past hx and updated today. Counseled on age appropriate health guidance and discussed screening recommendations. Vaccinations reviewed and discussed. All questions answered. - Comprehensive Metabolic Panel; Future  - CBC with Auto Differential; Future  - Lipid, Fasting; Future    2. Fatigue, unspecified type  -Discussed possible causes of fatigue including self-reported anxiety. We will check thyroid and CBC to r/o hypothyroidism and anemia. Continue to eat healthy diet and exercise. We will follow up with lab results and start on treatment for anxiety.   - TSH with Reflex; Future  - CBC with Auto Differential; Future    3. Anxiety  Newly discussed concerns today regarding symptoms highly suspicious of generalized anxiety disorder and panic disorder. See HPI above for clinical discussion of what patient has been experiencing. At this time the symptoms of anxiety are significant and impacting patient's daily life. We have discussed multidirectional approach to treatment and I have offered self-help mechanisms, patient to look into counseling and medication therapy. At this time patient would like to move forward with initiation of daily maintenance therapy with medications.   Prescription has been sent and we have discussed possible side effects and risks of the medication. We will plan to follow-up in 4 to 6 weeks to reassess. We discussed near-syncopal episodes and discussed other possible causes as well. ER precautions given. If symptoms persist, recommend further eval. All questions answered. - sertraline (ZOLOFT) 25 MG tablet; Take 1 tablet by mouth daily  Dispense: 30 tablet; Refill: 3      Return in 4 weeks (on 6/7/2022) for Follow up in 1 month, anxiety.              STEPHANIA Reed - CNP

## 2022-05-11 ASSESSMENT — ENCOUNTER SYMPTOMS
WHEEZING: 0
SORE THROAT: 0
COUGH: 0
ABDOMINAL PAIN: 0
CHEST TIGHTNESS: 1

## 2022-05-12 ENCOUNTER — TELEPHONE (OUTPATIENT)
Dept: FAMILY MEDICINE CLINIC | Age: 22
End: 2022-05-12

## 2022-05-12 NOTE — TELEPHONE ENCOUNTER
----- Message from Laura Singh sent at 5/10/2022  9:23 AM EDT -----  Subject: Message to Provider    QUESTIONS  Information for Provider? Pt is calling to check status of appt for panic   attacks. Patient was transferred via triage to be seen in 3 days and   unable to schedule due to old PCP listed and no availability. Patient   would like to est care with Paul Cerv[facility] or [address] [facility]. Will see anyone but prefers   to not see Severa Marking. Pt needs seen for new onset anxiety/ panic attacks x's   1 month, Please call to discuss scheduling.  ---------------------------------------------------------------------------  --------------  CALL BACK INFO  What is the best way for the office to contact you? OK to leave message on   voicemail  Preferred Call Back Phone Number? [phone]  ---------------------------------------------------------------------------  --------------  SCRIPT ANSWERS  Relationship to Patient?  Self

## 2022-05-12 NOTE — TELEPHONE ENCOUNTER
There are no new patient openings for at least a month; patient is having problems with anxiety-can they be seen for this and then establish? Please advise.

## 2022-05-12 NOTE — TELEPHONE ENCOUNTER
Patient has established with [facility].  and  are not accepting new patients at this time.

## 2022-08-12 DIAGNOSIS — F41.9 ANXIETY: ICD-10-CM

## 2022-12-28 NOTE — TELEPHONE ENCOUNTER
Called Levonne Boeck with no answer left message requesting return call
Can schedule acute visit
Patient was scheduled to be a new patient with DR Zander Lange but was called and rescheduled with Gilberto Lion. She is fine with seeing anyone in the office. She is wanting to establish to a new provider. She would like to be seen for a acute visit prior to her new patient appointment 1/14/2021 due to she has had a \"blotchy\" red rash on both upper and lower extremities for the past month. No one else in the household has it and she is scratching herself raw. She remembers something being said she is allergic to fruit. When she does eat Avocados her gums itch. She has taken Benadryl and OTC allergy medication and it given short term relief only. Please advise
clear bilaterally.  Pupils equal, round, and reactive to light.

## 2023-01-02 DIAGNOSIS — F41.9 ANXIETY: ICD-10-CM

## 2023-01-03 NOTE — TELEPHONE ENCOUNTER
I had mycharted patient that she is due. I now have left a message asking her to make an appointment.

## 2023-01-16 ENCOUNTER — OFFICE VISIT (OUTPATIENT)
Dept: PRIMARY CARE CLINIC | Age: 23
End: 2023-01-16
Payer: COMMERCIAL

## 2023-01-16 VITALS
DIASTOLIC BLOOD PRESSURE: 74 MMHG | OXYGEN SATURATION: 97 % | WEIGHT: 148.6 LBS | BODY MASS INDEX: 24.76 KG/M2 | SYSTOLIC BLOOD PRESSURE: 119 MMHG | TEMPERATURE: 97.6 F | HEART RATE: 78 BPM | HEIGHT: 65 IN

## 2023-01-16 DIAGNOSIS — F41.9 ANXIETY: Primary | ICD-10-CM

## 2023-01-16 PROCEDURE — 99213 OFFICE O/P EST LOW 20 MIN: CPT | Performed by: NURSE PRACTITIONER

## 2023-01-16 RX ORDER — PROPRANOLOL HYDROCHLORIDE 20 MG/1
TABLET ORAL
Qty: 30 TABLET | Refills: 1 | Status: SHIPPED | OUTPATIENT
Start: 2023-01-16

## 2023-01-16 ASSESSMENT — PATIENT HEALTH QUESTIONNAIRE - PHQ9
SUM OF ALL RESPONSES TO PHQ QUESTIONS 1-9: 0
2. FEELING DOWN, DEPRESSED OR HOPELESS: 0
SUM OF ALL RESPONSES TO PHQ9 QUESTIONS 1 & 2: 0
SUM OF ALL RESPONSES TO PHQ QUESTIONS 1-9: 0
1. LITTLE INTEREST OR PLEASURE IN DOING THINGS: 0
SUM OF ALL RESPONSES TO PHQ QUESTIONS 1-9: 0
SUM OF ALL RESPONSES TO PHQ QUESTIONS 1-9: 0

## 2023-01-16 ASSESSMENT — ENCOUNTER SYMPTOMS
COUGH: 0
SHORTNESS OF BREATH: 0
ABDOMINAL PAIN: 0
SORE THROAT: 0

## 2023-01-16 NOTE — PROGRESS NOTES
[address] [facility] [address] PRIMARY CARE  [address] [place] [id number]  Dept: [phone]  Dept Fax: [phone]     1/16/2023      Stephanie Edwards   2000     Chief Complaint   Patient presents with    Follow-up       HPI     Patient presents for follow up anxiety. Last seen in May. Reports that she has been taking Zoloft consistently and has noticed some improvement in anxiety but still has situational anxiety- mostly in social settings and is also planning a wedding. PHQ Scores 1/16/2023 5/10/2022 2/2/2021   PHQ2 Score 0 0 0   PHQ9 Score 0 0 0     Interpretation of Total Score Depression Severity: 1-4 = Minimal depression, 5-9 = Mild depression, 10-14 = Moderate depression, 15-19 = Moderately severe depression, 20-27 = Severe depression     Prior to Visit Medications    Medication Sig Taking? Authorizing Provider   propranolol (INDERAL) 20 MG tablet Take 1/2 tablet - 1 tablets 30 - 60 minutes prior to anxiety provoking event. Take no more than 60mg in one day. Yes STEPHANIA Perdomo - CNP   sertraline (ZOLOFT) 50 MG tablet TAKE ONE TABLET BY MOUTH EVERY MORNING Yes Tauna Fleischer,    levonorgestrel (MIRENA, 52 MG,) IUD 52 mg 1 each by IntraUTERine route once Yes Historical Provider, MD       Past Medical History:   Diagnosis Date    Gestational hypertension, third trimester 4/14/2020        Social History     Tobacco Use    Smoking status: Never    Smokeless tobacco: Never   Vaping Use    Vaping Use: Never used   Substance Use Topics    Alcohol use: Not Currently     Comment: monthly    Drug use: No        No past surgical history on file.      No Known Allergies     Family History   Problem Relation Age of Onset    No Known Problems Mother     No Known Problems Father     No Known Problems Maternal Grandmother     No Known Problems Paternal Grandmother     Diabetes Paternal Grandfather     No Known Problems Sister     No Known Problems Brother     No Known Problems Brother         Patient's past medical history, surgical history, family history, medications, and allergies  were all reviewed and updated as appropriate today. Review of Systems   Constitutional:  Negative for fever. HENT:  Negative for congestion and sore throat. Respiratory:  Negative for cough and shortness of breath. Cardiovascular:  Positive for palpitations (intermittent). Negative for chest pain. Gastrointestinal:  Negative for abdominal pain. Musculoskeletal:  Negative for arthralgias and myalgias. Neurological:  Negative for dizziness and weakness. Hematological:  Negative for adenopathy. Psychiatric/Behavioral:  The patient is nervous/anxious. /74 (Site: Left Upper Arm, Position: Sitting, Cuff Size: Small Adult)   Pulse 78   Temp 97.6 °F (36.4 °C)   Ht 5' 4.5\" (1.638 m)   Wt 148 lb 9.6 oz (67.4 kg)   SpO2 97%   BMI 25.11 kg/m²      Physical Exam  Constitutional:       Appearance: Normal appearance. HENT:      Head: Normocephalic and atraumatic. Cardiovascular:      Rate and Rhythm: Normal rate and regular rhythm. Heart sounds: Normal heart sounds. No murmur heard. Pulmonary:      Effort: Pulmonary effort is normal.      Breath sounds: Normal breath sounds. No wheezing. Musculoskeletal:         General: No swelling. Skin:     General: Skin is warm and dry. Neurological:      General: No focal deficit present. Mental Status: She is alert and oriented to person, place, and time. Psychiatric:         Mood and Affect: Mood normal.         Behavior: Behavior normal.       Assessment:  Encounter Diagnosis   Name Primary? Anxiety Yes       Plan:  1. Anxiety  -Anxiety improved since being on Zoloft but still has episodes of anxiety attacks. She feels like this is situational such as in social situations and also has increased stress around wedding planning.  At this time we dicussed starting on prn propranolol for anxiety attacks; discussed use of medication and potential side effects. She is to follow up in 4-6 weeks to reassess. If anxiety not improved, can consider increasing zoloft vs alternative medication. Patient agreeable to plan. Precautions given. - propranolol (INDERAL) 20 MG tablet; Take 1/2 tablet - 1 tablets 30 - 60 minutes prior to anxiety provoking event. Take no more than 60mg in one day. Dispense: 30 tablet; Refill: 1      Return in about 4 weeks (around 2/13/2023) for Follow-up for anxiety .              Santina Claude, APRN - CNP

## 2023-05-11 DIAGNOSIS — F41.9 ANXIETY: ICD-10-CM

## 2023-05-12 DIAGNOSIS — F41.9 ANXIETY: ICD-10-CM

## 2023-05-12 NOTE — TELEPHONE ENCOUNTER
Fely Gardiner called earlier this afternoon. I let her know that the medication was called in yesterday.  She will check with the pharmacy

## 2023-06-01 ENCOUNTER — OFFICE VISIT (OUTPATIENT)
Dept: PRIMARY CARE CLINIC | Age: 23
End: 2023-06-01
Payer: COMMERCIAL

## 2023-06-01 VITALS
SYSTOLIC BLOOD PRESSURE: 108 MMHG | OXYGEN SATURATION: 99 % | DIASTOLIC BLOOD PRESSURE: 68 MMHG | HEIGHT: 65 IN | BODY MASS INDEX: 24.72 KG/M2 | TEMPERATURE: 98.1 F | HEART RATE: 71 BPM | WEIGHT: 148.4 LBS

## 2023-06-01 DIAGNOSIS — F41.9 ANXIETY: Primary | ICD-10-CM

## 2023-06-01 PROCEDURE — 99213 OFFICE O/P EST LOW 20 MIN: CPT | Performed by: NURSE PRACTITIONER

## 2023-06-01 RX ORDER — SERTRALINE HYDROCHLORIDE 100 MG/1
100 TABLET, FILM COATED ORAL DAILY
Qty: 30 TABLET | Refills: 3 | Status: SHIPPED | OUTPATIENT
Start: 2023-06-01

## 2023-06-01 SDOH — ECONOMIC STABILITY: HOUSING INSECURITY
IN THE LAST 12 MONTHS, WAS THERE A TIME WHEN YOU DID NOT HAVE A STEADY PLACE TO SLEEP OR SLEPT IN A SHELTER (INCLUDING NOW)?: NO

## 2023-06-01 SDOH — ECONOMIC STABILITY: INCOME INSECURITY: HOW HARD IS IT FOR YOU TO PAY FOR THE VERY BASICS LIKE FOOD, HOUSING, MEDICAL CARE, AND HEATING?: NOT HARD AT ALL

## 2023-06-01 SDOH — ECONOMIC STABILITY: FOOD INSECURITY: WITHIN THE PAST 12 MONTHS, THE FOOD YOU BOUGHT JUST DIDN'T LAST AND YOU DIDN'T HAVE MONEY TO GET MORE.: NEVER TRUE

## 2023-06-01 SDOH — ECONOMIC STABILITY: FOOD INSECURITY: WITHIN THE PAST 12 MONTHS, YOU WORRIED THAT YOUR FOOD WOULD RUN OUT BEFORE YOU GOT MONEY TO BUY MORE.: NEVER TRUE

## 2023-06-01 ASSESSMENT — ENCOUNTER SYMPTOMS
ABDOMINAL PAIN: 0
SHORTNESS OF BREATH: 0
SORE THROAT: 0
COUGH: 0

## 2023-06-01 NOTE — PROGRESS NOTES
Dispense: 30 tablet; Refill: 3      Return in about 4 weeks (around 6/29/2023), or if symptoms worsen or fail to improve.              Robert Arizmendi, APRN - CNP

## 2023-07-17 ENCOUNTER — OFFICE VISIT (OUTPATIENT)
Dept: PRIMARY CARE CLINIC | Age: 23
End: 2023-07-17
Payer: COMMERCIAL

## 2023-07-17 VITALS
SYSTOLIC BLOOD PRESSURE: 104 MMHG | OXYGEN SATURATION: 99 % | HEIGHT: 65 IN | BODY MASS INDEX: 25.02 KG/M2 | TEMPERATURE: 98 F | WEIGHT: 150.2 LBS | DIASTOLIC BLOOD PRESSURE: 70 MMHG | HEART RATE: 68 BPM

## 2023-07-17 DIAGNOSIS — R21 RASH: Primary | ICD-10-CM

## 2023-07-17 PROCEDURE — 99213 OFFICE O/P EST LOW 20 MIN: CPT | Performed by: NURSE PRACTITIONER

## 2023-07-17 RX ORDER — TRIAMCINOLONE ACETONIDE 1 MG/G
CREAM TOPICAL
Qty: 45 G | Refills: 1 | Status: SHIPPED | OUTPATIENT
Start: 2023-07-17

## 2023-07-17 NOTE — PROGRESS NOTES
[address] [facility]. PRIMARY CARE  [address] [facility] [address]  Dept: [phone]  Dept Fax: [phone]     7/17/2023      Cathi Rodriguez   2000     Chief Complaint   Patient presents with    Other     Behind the ears eczema possible derm referral       HPI     Patient presents with complaint of rash behind bilateral ears. It is non-itchy, non-painful. She states it has been there for years. She thinks it may be eczema or psoriasis but has not had it treated. She is getting  in a few months so would like to get it addressed. PHQ Scores 1/16/2023 5/10/2022 2/2/2021   PHQ2 Score 0 0 0   PHQ9 Score 0 0 0     Interpretation of Total Score Depression Severity: 1-4 = Minimal depression, 5-9 = Mild depression, 10-14 = Moderate depression, 15-19 = Moderately severe depression, 20-27 = Severe depression     Prior to Visit Medications    Medication Sig Taking? Authorizing Provider   sertraline (ZOLOFT) 100 MG tablet Take 1 tablet by mouth daily Yes STEPHANIA Carlson CNP   propranolol (INDERAL) 20 MG tablet Take 1/2 tablet - 1 tablets 30 - 60 minutes prior to anxiety provoking event. Take no more than 60mg in one day. Yes STEPHANIA Carlson CNP   levonorgestrel (MIRENA, 52 MG,) IUD 52 mg 1 each by IntraUTERine route once Yes Historical Provider, MD       Past Medical History:   Diagnosis Date    Gestational hypertension, third trimester 4/14/2020        Social History     Tobacco Use    Smoking status: Never    Smokeless tobacco: Never   Vaping Use    Vaping Use: Never used   Substance Use Topics    Alcohol use: Not Currently     Comment: monthly    Drug use: No        No past surgical history on file.      No Known Allergies     Family History   Problem Relation Age of Onset    No Known Problems Mother     No Known Problems Father     No Known Problems Maternal Grandmother     No Known Problems Paternal Grandmother     Diabetes Paternal Grandfather

## 2023-09-22 ENCOUNTER — TELEMEDICINE (OUTPATIENT)
Dept: PRIMARY CARE CLINIC | Age: 23
End: 2023-09-22
Payer: COMMERCIAL

## 2023-09-22 DIAGNOSIS — F41.9 ANXIETY: ICD-10-CM

## 2023-09-22 DIAGNOSIS — K21.9 GASTROESOPHAGEAL REFLUX DISEASE WITHOUT ESOPHAGITIS: Primary | ICD-10-CM

## 2023-09-22 PROCEDURE — 99214 OFFICE O/P EST MOD 30 MIN: CPT | Performed by: NURSE PRACTITIONER

## 2023-09-22 RX ORDER — OMEPRAZOLE 40 MG/1
40 CAPSULE, DELAYED RELEASE ORAL
Qty: 30 CAPSULE | Refills: 1 | Status: SHIPPED | OUTPATIENT
Start: 2023-09-22

## 2023-09-22 RX ORDER — HYDROXYZINE HYDROCHLORIDE 25 MG/1
25 TABLET, FILM COATED ORAL NIGHTLY
Qty: 30 TABLET | Refills: 0 | Status: SHIPPED | OUTPATIENT
Start: 2023-09-22 | End: 2023-10-22

## 2023-09-22 ASSESSMENT — ENCOUNTER SYMPTOMS
SHORTNESS OF BREATH: 0
COUGH: 0

## 2023-09-22 NOTE — PROGRESS NOTES
Fausto Henson, was evaluated through a synchronous (real-time) audio-video encounter. The patient (or guardian if applicable) is aware that this is a billable service, which includes applicable co-pays. This Virtual Visit was conducted with patient's (and/or legal guardian's) consent. Patient identification was verified, and a caregiver was present when appropriate. The patient was located at Home: [address] [facility]  [address] [facility]  Provider was located at [facility] (Appt Dept): [address] [facility],  [address] [place] [address]      Fausto Henson (:  2000) is a Established patient, presenting virtually for evaluation of the following:    HPI  -Patient presents for complaint of reflux. States this is a new concern for her. She is getting  in 23 days and has had a a lot of stress around wedding and family issues. She thinks that could be contributing. States last night she had it some brownies and then later in the night she had burning in her throat, heartburn, indigestion. She has been taking Nexium for the past week and using Tums and Pepto-Bismol as needed with mild-moderate improvement. She does have history of anxiety as well and is currently taking Zoloft 100 mg daily. She is also using propranolol as needed. She is interested in trying alternative medications as well to get anxiety better controlled. Assessment & Plan   Below is the assessment and plan developed based on review of pertinent history, physical exam, labs, studies, and medications. 1. Gastroesophageal reflux disease without esophagitis  -Symptoms consistent with dyspepsia. No red flags by clinical history. At this time we have discussed diet and lifestyle modifications that should be made, likely to improve heartburn symptoms. We have discussed the various over-the-counter medications that can be used on an as needed, or daily basis. At this time we will prescribe Prilosec which she can use daily.   Recommended

## 2023-10-26 DIAGNOSIS — F41.9 ANXIETY: ICD-10-CM

## 2023-10-26 RX ORDER — SERTRALINE HYDROCHLORIDE 100 MG/1
100 TABLET, FILM COATED ORAL DAILY
Qty: 30 TABLET | Refills: 3 | Status: SHIPPED | OUTPATIENT
Start: 2023-10-26

## 2023-10-28 DIAGNOSIS — F41.9 ANXIETY: ICD-10-CM

## 2023-10-30 RX ORDER — SERTRALINE HYDROCHLORIDE 100 MG/1
100 TABLET, FILM COATED ORAL DAILY
Qty: 30 TABLET | Refills: 3 | OUTPATIENT
Start: 2023-10-30